# Patient Record
Sex: FEMALE | Race: BLACK OR AFRICAN AMERICAN | NOT HISPANIC OR LATINO | Employment: FULL TIME | URBAN - METROPOLITAN AREA
[De-identification: names, ages, dates, MRNs, and addresses within clinical notes are randomized per-mention and may not be internally consistent; named-entity substitution may affect disease eponyms.]

---

## 2024-08-27 ENCOUNTER — HOSPITAL ENCOUNTER (EMERGENCY)
Facility: HOSPITAL | Age: 34
Discharge: HOME | End: 2024-08-28
Attending: EMERGENCY MEDICINE
Payer: MEDICAID

## 2024-08-27 DIAGNOSIS — N76.0 BACTERIAL VAGINOSIS: Primary | ICD-10-CM

## 2024-08-27 DIAGNOSIS — B96.89 BACTERIAL VAGINOSIS: Primary | ICD-10-CM

## 2024-08-27 LAB
BILIRUB UR QL STRIP: NEGATIVE
CLARITY UR: ABNORMAL
COLOR UR: YELLOW
GLUCOSE UR STRIP-MCNC: NEGATIVE MG/DL
HGB UR QL STRIP.AUTO: NEGATIVE
KETONES UR QL STRIP: NEGATIVE
LEUKOCYTE ESTERASE UR QL STRIP.AUTO: NEGATIVE
NITRITE UR QL STRIP: NEGATIVE
PH UR STRIP.AUTO: 8.5 [PH] (ref 5–8)
PROT UR QL STRIP: NEGATIVE
SP GR UR STRIP: 1.02 (ref 1–1.03)
UROBILINOGEN UR QL STRIP: ABNORMAL

## 2024-08-27 PROCEDURE — 81001 URINALYSIS AUTO W/SCOPE: CPT | Performed by: PHYSICIAN ASSISTANT

## 2024-08-27 PROCEDURE — 99283 EMERGENCY DEPT VISIT LOW MDM: CPT

## 2024-08-27 PROCEDURE — 87210 SMEAR WET MOUNT SALINE/INK: CPT | Performed by: PHYSICIAN ASSISTANT

## 2024-08-27 PROCEDURE — 87491 CHLMYD TRACH DNA AMP PROBE: CPT | Performed by: PHYSICIAN ASSISTANT

## 2024-08-27 PROCEDURE — 87591 N.GONORRHOEAE DNA AMP PROB: CPT | Performed by: PHYSICIAN ASSISTANT

## 2024-08-27 PROCEDURE — 87220 TISSUE EXAM FOR FUNGI: CPT | Performed by: PHYSICIAN ASSISTANT

## 2024-08-27 NOTE — Clinical Note
[facility] EMERGENCY DEPARTMENT [place]  [address]  [facility] [address]  Phone: [phone]  Fax: [phone]    Judi Chavira was seen and treated in our emergency department on 8/27/2024.  She may return to work on 08/30/2024.         Thank you for choosing [facility].    Donald Berry, DO

## 2024-08-28 VITALS
OXYGEN SATURATION: 99 % | TEMPERATURE: 98.2 F | RESPIRATION RATE: 16 BRPM | DIASTOLIC BLOOD PRESSURE: 75 MMHG | BODY MASS INDEX: 27.6 KG/M2 | SYSTOLIC BLOOD PRESSURE: 119 MMHG | HEIGHT: 62 IN | WEIGHT: 150 LBS | HEART RATE: 80 BPM

## 2024-08-28 LAB
AMORPH URATE CRY URNS QL MICRO: ABNORMAL /HPF
BACTERIA UR QL AUTO: ABNORMAL /HPF
CLUE CELLS SPEC QL WET PREP: ABNORMAL
HYALINE CASTS UR QL AUTO: ABNORMAL /LPF
HYDATID CYST SPEC WET PREP: ABNORMAL
KOH PREP NAIL: NORMAL
RBC # UR STRIP: ABNORMAL /HPF
REF LAB TEST METHOD: ABNORMAL
SQUAMOUS #/AREA URNS HPF: ABNORMAL /HPF
T VAGINALIS SPEC QL WET PREP: ABNORMAL
WBC # UR STRIP: ABNORMAL /HPF
WBC SPEC QL WET PREP: ABNORMAL
YEAST GENITAL QL WET PREP: ABNORMAL

## 2024-08-28 RX ORDER — METRONIDAZOLE 500 MG/1
500 TABLET ORAL 3 TIMES DAILY
Qty: 30 TABLET | Refills: 0 | Status: SHIPPED | OUTPATIENT
Start: 2024-08-28 | End: 2024-09-07

## 2024-08-28 RX ORDER — METRONIDAZOLE 500 MG/1
500 TABLET ORAL ONCE
Status: COMPLETED | OUTPATIENT
Start: 2024-08-28 | End: 2024-08-28

## 2024-08-28 RX ADMIN — METRONIDAZOLE 500 MG: 500 TABLET ORAL at 00:28

## 2024-08-28 NOTE — FSED PROVIDER NOTE
Subjective   History of Present Illness  Patient is a 34-year-old female who presents emergency room complaining of vaginal discharge.  Patient reports she has a history of bacterial vaginosis and feels as if she has this again.  She reports that she does have white vaginal discharge and an odor.  She reports that she recently was treated but did not follow-up with her gynecologist after being treated.  She states she took p.o. pills and received an injection at the hospital that she was treated.  Unsure what the injection was.  Patient denies STD.  Patient denies pregnancy.  Patient denies dysuria, abdominal pain, pelvic pain.  Patient does not take any medications for this.    History provided by:  Patient   used: No        Review of Systems   Genitourinary:  Positive for vaginal discharge.   All other systems reviewed and are negative.      History reviewed. No pertinent past medical history.    No Known Allergies    History reviewed. No pertinent surgical history.    History reviewed. No pertinent family history.    Social History     Socioeconomic History    Marital status: Single           Objective   Physical Exam  Vitals and nursing note reviewed. Exam conducted with a chaperone present.   Constitutional:       Appearance: Normal appearance. She is normal weight.   HENT:      Head: Normocephalic.      Nose: Nose normal.   Eyes:      Pupils: Pupils are equal, round, and reactive to light.   Cardiovascular:      Rate and Rhythm: Normal rate and regular rhythm.   Pulmonary:      Effort: Pulmonary effort is normal.      Breath sounds: Normal breath sounds.   Abdominal:      General: Abdomen is flat.      Tenderness: There is no abdominal tenderness.   Genitourinary:     General: Normal vulva.      Exam position: Lithotomy position.      Pubic Area: No rash.       Vagina: Vaginal discharge present.      Cervix: Normal.      Comments: White malodorous discharge   Musculoskeletal:          General: Normal range of motion.   Skin:     General: Skin is warm and dry.   Neurological:      General: No focal deficit present.      Mental Status: She is alert and oriented to person, place, and time. Mental status is at baseline.   Psychiatric:         Mood and Affect: Mood normal.         Behavior: Behavior normal.         Thought Content: Thought content normal.         Judgment: Judgment normal.         Procedures           ED Course  ED Course as of 08/28/24 0021   Wed Aug 28, 2024   0015 Color, UA: Yellow [WB]   0015 Leukocytes, UA: Negative [WB]   0015 Nitrite, UA: Negative [WB]   0015 WBC, UA(!): 3-5 [WB]   0015 Bacteria, UA(!): 3+ [WB]   0015 KOH Prep: No yeast or hyphal elements seen [WB]   0015 YEAST: No yeast seen [WB]   0015 WBC'S(!): 1+ WBC's seen [WB]   0015 Clue Cells, Wet Prep: No Clue cells seen [WB]   0015 Trichomonas, Wet Prep: No Trichomonas seen [WB]      ED Course User Index  [WB] Sherron, Amy ROBLEDO PA-C                                           Medical Decision Making  Will treat patient for bacterial vaginosis and refer to gynecology. Verbalizes understanding of today's information and need for f/u       Amount and/or Complexity of Data Reviewed  Labs: ordered. Decision-making details documented in ED Course.    Risk  Prescription drug management.        Final diagnoses:   Bacterial vaginosis       ED Disposition  ED Disposition       ED Disposition   Discharge    Condition   Stable    Comment   --               [facility] EMERGENCY DEPARTMENT [place]  [address]  [facility] [address] [phone]    As needed, If symptoms worsen         Medication List        New Prescriptions      metroNIDAZOLE 500 MG tablet  Commonly known as: FLAGYL  Take 1 tablet by mouth 3 (Three) Times a Day for 10 days.               Where to Get Your Medications        These medications were sent to [facility]/pharmacy #[id number] - [place], [place] - [address] -  [phone]  - [phone] FX  [address], [facility] [address]      Hours: 24-hours Phone: [phone]   metroNIDAZOLE 500 MG tablet

## 2024-08-30 LAB
C TRACH RRNA SPEC QL NAA+PROBE: NEGATIVE
N GONORRHOEA RRNA SPEC QL NAA+PROBE: NEGATIVE